# Patient Record
(demographics unavailable — no encounter records)

---

## 2020-02-12 NOTE — HP
Admitting History and Physical





- Admission


Chief Complaint: abd pain


History of Present Illness: 


77 year old F with h/o hypothyroidism, CAD s/p PCI, HTN, HLD, vertigo and 

recurrent diverticulitis presents to ED for evaluation of abd pain associated 

with multiple loose stools, decreased appetite, headache, malaise and nausea 

over the last 8days. She denies fever, chills, recent travel or sick contacts. 

Her symptoms have gradually worsened over the last few days, she had one 

episode of blood on toilet paper last evening and at the urging of her family, 

she presented to ED for evaluation. 





In ED:


Vitals: T 98, /48, HR 76, RR 20, O2 sat 98


CT abd and pelvis: Extensive left-sided colonic diverticulosis is seen, minimal 

to mild pericolonic soft tissue stranding adjacent to the lower third of the 

descending colon and the adjacent upper third of the sigmoid colon which could 

be on the basis of mild acute uncomplicated diverticulitis. 


Labs: unremarkable, WBC =11. Pt given 1 dose of flagyl 500mg and Cipro 500mg x 

1 dose. Morphine and APAP IV formulation for pain management. 


Decision made to admit patient for further monitoring.








History Source: Patient


Limitations to Obtaining History: No Limitations





- Past Medical History


CNS: Yes: Vertigo


Cardiovascular: Yes: HTN, Hyperlipdemia


Gastrointestinal: Yes: Diverticulitis, Diverticulosis, GERD


Reproductive: Yes: Postmenopausal


...Pregnant: No


...: 4


...Para: 4


Psych: Yes: Anxiety


Musculoskeletal: Yes: Osteoarthritis


Endocrine: Yes: Hypothyroidism





- Past Surgical History


Past Surgical History: Yes: Appendectomy, Cataract Removal (left eye), 

Cholecystectomy, Hernia Repair (Left inguinal), Hysterectomy


Additional Past Surgical History: 





Ovarian cyst removal prior to hysterectomy





- Smoking History


Smoking history: Never smoked


Have you smoked in the past 12 months: No


Aproximately how many cigarettes per day: 0





- Alcohol/Substance Use


Hx Alcohol Use: No


History of Substance Use: reports: None





- Social History


Usual Living Arrangement: Yes: Alone


ADL: Support Services (Pt ambulates with a cane [facility] 5hrs M - F, 3hrs Sat/Sun)


Occupation: Retired dressmaker


History of Recent Travel: No


Other Social History: HCP: daughter - Rozina Ohara [phone]





Home Medications





- Allergies


Allergies/Adverse Reactions: 


 Allergies











Allergy/AdvReac Type Severity Reaction Status Date / Time


 


No Known Allergies Allergy   Verified 20 14:05














- Home Medications


Home Medications: 


Ambulatory Orders





Esomeprazole Mag Trihydrate [Nexium] 40 mg PO DAILY 12 


Amlodipine Besylate [Norvasc -] 5 mg PO DAILY #0 tablet 14 


Rosuvastatin Calcium [Crestor] 20 mg PO HS #0 tablet 14 


Meclizine HCl [Antivert -] 25 mg PO PRN PRN 06/30/15 


Metoprolol Tartrate [Lopressor -] 50 mg PO BID 06/30/15 


Levothyroxine [Synthroid -] 50 mcg PO DAILY 20 


Loratadine [Claritin] 10 mg PO DAILY 20 


Potassium Chloride 10 meq PO DAILY 20 











Family Medical History


Other Family History: M  (90) multiple CVA,  from cancer of her 

reproductive organs.  F  (90) CVA.  Sister  (78) end stage lung 

disease due to smoking.  Brother  (46) murdered.  Brother alive (75) 

ESRD on HD.  Sister alive (74) recurrent diverticulitis s/p colon resection





Review of Systems





- Review of Systems


Constitutional: reports: Loss of Appetite, Weakness


Eyes: reports: No Symptoms


HENT: reports: No Symptoms


Neck: reports: No Symptoms


Cardiovascular: reports: No Symptoms


Respiratory: reports: No Symptoms


Gastrointestinal: reports: Abdominal Pain, Diarrhea, Nausea


Genitourinary: reports: No Symptoms


Breasts: reports: No Symptoms Reported


Musculoskeletal: reports: No Symptoms


Integumentary: reports: No Symptoms


Neurological: reports: No Symptoms


Endocrine: reports: No Symptoms


Hematology/Lymphatic: reports: No Symptoms


Psychiatric: reports: No Symptoms





Physical Examination


Vital Signs: 


 Vital Signs











Temperature  98 F   20 14:07


 


Pulse Rate  76   20 14:07


 


Respiratory Rate  20   20 14:07


 


Blood Pressure  124/48 L  20 14:07


 


O2 Sat by Pulse Oximetry (%)  98   20 14:07











Constitutional: Yes: No Distress, Calm, Obese


Eyes: Yes: Conjunctiva Clear


HENT: Yes: Atraumatic, Normocephalic


Neck: Yes: Supple, Trachea Midline


Cardiovascular: Yes: Regular Rate and Rhythm


Respiratory: Yes: Regular, CTA Bilaterally


Gastrointestinal: Yes: Normal Bowel Sounds, Soft, Abdomen, Obese, Tenderness (

LLQ)


...Rectal Exam: Yes: Deferred


Musculoskeletal: Yes: WNL


Extremities: Yes: WNL


Edema: No


Peripheral Pulses WNL: Yes


Peripheral Pulses: Left Radial: 2+, Right Radial: 2+


Integumentary: Yes: WNL


Neurological: Yes: Alert, Oriented


...Motor Strength: WNL


Psychiatric: Yes: Alert, Oriented


Labs: 


 CBC, BMP





 20 16:30 





 20 16:30 











Imaging





- Results


Cat Scan: Report Reviewed (CT abd and pelvis 2020 Impression:  Extensive 

left-sided colonic diverticulosis is seen. In comparison to a  CT exam 

there appears to be interval development of subtle minimal to mild pericolonic 

soft tissue stranding adjacent to the lower third of the descending colon and 

the adjacent upper third of the sigmoid colon which could be on the basis of 

mild acute uncomplicated diverticulitis. Correlate clinically.  The remainder 

of the exam demonstrates no definite interval change.  Stable 1.5 cm left 

adrenal nodule very likely representing an adenoma. Biochemical evaluation is 

suggested.  as on the prior exam the gallbladder is not visualized which may be 

on a postsurgical basis. Reported By: Matias Kilpatrick MD 20)





Problem List





- Problems


(1) Diverticulitis


Assessment/Plan: 


ID and GI consult in the morning


Zosyn TID


trend WBC and temp curve


Clear liquid diet- progress as tolerated


reglan PRN nausea/vomiting


gentle hydration overnight


IV tylenol PRN moderate pain


IV morhine PRN severe pain





Code(s): K57.92 - DVTRCLI OF INTEST, PART UNSP, W/O PERF OR ABSCESS W/O BLEED   





(2) HTN (hypertension)


Assessment/Plan: 


continue norvasc


Code(s): I10 - ESSENTIAL (PRIMARY) HYPERTENSION   





(3) Hyperlipidemia


Assessment/Plan: 


crestor 20mg qhs


Code(s): E78.5 - HYPERLIPIDEMIA, UNSPECIFIED   





(4) Hypothyroidism


Assessment/Plan: 


synthroid 50mcg qam


check TSH and TF4 with AM labs


Code(s): E03.9 - HYPOTHYROIDISM, UNSPECIFIED   





(5) Prophylactic measure


Assessment/Plan: 


SC heparin TID


Ambulate as tolerated


fall precautions


Code(s): Z29.9 - ENCOUNTER FOR PROPHYLACTIC MEASURES, UNSPECIFIED   





Assessment/Plan


Code status: Full








Visit type





- Emergency Visit


Emergency Visit: Yes


ED Registration Date: 20


Care time: The patient presented to the Emergency Department on the above date 

and was hospitalized for further evaluation of their emergent condition.





- New Patient


This patient is new to me today: Yes


Date on this admission: 20





- Critical Care


Critical Care patient: No

## 2020-02-12 NOTE — PDOC
Documentation entered by Lisa Stover SCRIBE, acting as scribe for 

Rahda Link MD.








Radha Link MD:  This documentation has been prepared by the Ck berman Nirvannie, SCRIBE, under my direction and personally reviewed by me in 

its entirety.  I confirm that the documentation accurately reflects all work, 

treatment, procedures, and medical decision making performed by me.  





Attending Attestation





- Resident


Resident Name: Danyel Pearce





- ED Attending Attestation


I have performed the following: I have examined & evaluated the patient, The 

case was reviewed & discussed with the resident, I agree w/resident's findings 

& plan, Exceptions are as noted





- HPI


HPI: 





02/12/20 16:40


The patient is a 77 year old female, with a significant past medical history of 

hypertension, hyperlipidemia, GERD, CVA, colitis diverticulitis, and anxiety, 

who presents to the emergency department with 10/10 abdominal pain, waxing and 

waning (8-10/10) worse in the left LLQ. She notes associated non-bloody watery 

diarrhea, dizziness, nausea, and generalized weakness. Patient notes her 

symptoms to be similar to previous bouts of diverticulitis.





She denies recent fevers, chills, headache or dizziness. She denies recent 

nausea, vomit, diarrhea or constipation. She denies recent  dysuria, frequency, 

urgency or hematuria. She denies recent chest pain or shortness of breath.





Allergies: NKDA 


GI: Dr. Diaz








- Physicial Exam


PE: 





02/12/20 19:05


awake alert lungs clear bilat heart rrr no mrg abd soft mild llq ttp no rebound 

no guarding. ext wwp. no edema. no calf tenderness. 





- Medical Decision Making





02/12/20 19:06


78 yo F with h/o diverticulitis here with llq pain. did have loose watery 

stools several episodes. some streaked with blood day prior. today nonblooyd. 

no vomiting but having nausea. no f/c





plan ct a/p r/o diverticulitis, colitis labs ua r/o uti.


will likely require admission if diverituclitis. 


signed out to [facility]

## 2020-02-12 NOTE — PDOC
History of Present Illness





- General


Chief Complaint: Pain


Stated Complaint: ABD PAIN


Time Seen by Provider: 02/12/20 15:37





- History of Present Illness


Initial Comments: 


02/12/20 16:07


76 yo F PMH HTN, HLD, CAD s/p stent on clopidogrel, unstable angina, GERD, 

diverticulitis w/ prior micro-perforations, colitis, CVA w/ no residual symptoms

, cholecystectomy, hernia repair, and anxiety, presenting with diffuse 

abdominal pain. Croatian speaking only. Patient reports that she has had diffuse 

abdominal pain for the past 10 days, ranging from 8/10 to 10/10, worse at the 

LLQ and the anus, associated with profuse watery diarrhea, nausea without 

vomiting, generalized weakness, and dizziness like the room is spinning. 





States that it feels identical to her prior diverticulitis flares. Reports that 

her family has been trying to bring her in for multiple days but she was 

reluctant; they managed to convince her today. Has taken diclofenac at home 

with some relief. Has been able to eat, but less than her normal 2/2 nausea.





Her GI doctor is Dr. Diaz. Her PCP is Dr. Damien Oseguera.





Denies fevers/chills, recent travel, CP, urinary symptoms. Complains of chronic 

shortness of breath unchanged from baseline, which she believes may be 2/2 

anxiety.








Past History





- Past Medical History


Allergies/Adverse Reactions: 


 Allergies











Allergy/AdvReac Type Severity Reaction Status Date / Time


 


No Known Allergies Allergy   Verified 02/12/20 14:05











Home Medications: 


Ambulatory Orders





Esomeprazole Mag Trihydrate [Nexium] 40 mg PO DAILY 07/28/12 


Amlodipine Besylate [Norvasc -] 5 mg PO DAILY #0 tablet 01/06/14 


Rosuvastatin Calcium [Crestor] 20 mg PO HS #0 tablet 01/06/14 


Meclizine HCl [Antivert -] 25 mg PO PRN PRN 06/30/15 


Metoprolol Tartrate [Lopressor -] 50 mg PO BID 06/30/15 


Levothyroxine [Synthroid -] 50 mcg PO DAILY 02/12/20 


Loratadine [Claritin] 10 mg PO DAILY 02/12/20 


Potassium Chloride 10 meq PO DAILY 02/12/20 








Anemia: No


Asthma: No


Cancer: No


Cardiac Disorders: Yes (UNSTABLE ANGINA)


CVA: No


COPD: No


CHF: No


Dementia: No


Diabetes: No


GI Disorders: Yes (Cholitis, diverticulitis)


 Disorders: No


HTN: Yes


Hypercholesterolemia: Yes


Liver Disease: No


Seizures: No


Thyroid Disease: No





- Surgical History


Abdominal Surgery: Yes


Appendectomy: No


Cardiac Surgery: Yes (STENT.)


Cholecystectomy: Yes


Lung Surgery: No


Neurologic Surgery: No


Orthopedic Surgery: No





- Psycho Social/Smoking Cessation Hx


Smoking Status: No


Smoking History: Former smoker


Have you smoked in the past 12 months: No


Number of Cigarettes Smoked Daily: 0


Information on smoking cessation initiated: No


Hx Alcohol Use: No


Drug/Substance Use Hx: No


Substance Use Type: None


Hx Substance Use Treatment: No





**Review of Systems





- Review of Systems


Comments:: 


02/12/20 16:15


GENERAL/CONSTITUTIONAL: endorses generalized weakness. Denies fever, chills, 

diaphoresis, malaise, loss of appetite, weight change


HEAD, EYES, EARS, NOSE AND THROAT: denies rhinorrhea, nasal congestion, throat 

pain, throat swelling, difficulty swallowing, mouth swelling, ear pain, eye pain

, visual changes


NEUROLOGIC: endorses dizziness. Denies headache, focal weakness or paresthesias

, unsteady gait, seizure, mental status changes, bladder or bowel incontinence


CARDIOVASCULAR: denies chest pain, syncope, palpitations, irregular heart rate, 

lightheadedness, peripheral edema


RESPIRATORY: endorses chronic shortness of breath. Denies cough, dyspnea with 

exertion, orthopnea, wheezing, stridor, hemoptysis


GASTROINTESTINAL: endorses diffuse abdominal pain, profuse watery diarrhea, and 

nausea w/o vomiting. Denies abdominal distension, constipation, melena, 

hematochezia


GENITOURINARY: denies dysuria, frequency, urgency, hesitancy, hematuria, flank 

pain, genital pain


MUSCULOSKELETAL: denies myalgia, arthralgia, joint swelling, back pain, neck 

pain


SKIN: denies rash, itching, pallor


HEMATOLOGIC/IMMUNOLOGIC: denies easy bleeding, easy bruising, lymphadenopathy, 

frequent infections


ENDOCRINE: denies unexplained weight gain, unexplained weight loss, heat 

intolerance, cold intolerance


PSYCHIATRIC: denies anxiety, depression, suicidal or homicidal ideation, 

hallucinations








*Physical Exam





- Vital Signs


 Last Vital Signs











Temp Pulse Resp BP Pulse Ox


 


 98 F   76   20   124/48 L  98 


 


 02/12/20 14:07  02/12/20 14:07  02/12/20 14:07  02/12/20 14:07  02/12/20 14:07














- Physical Exam


02/12/20 16:17


GENERAL: Awake, alert, and fully oriented, in moderate distress.


HEAD: Normal with no signs of trauma.


EYES: Pupils equal, round and reactive to light, extraocular movements intact, 

sclera anicteric, conjunctiva clear. No lid lag.


EARS, NOSE, THROAT: Ears normal, nares patent, oropharynx clear without 

exudates. Dry mucous membranes.


NECK: Normal range of motion, supple without lymphadenopathy


LUNGS: Breath sounds equal, clear to auscultation bilaterally. No wheezes, and 

no crackles. No accessory muscle use.


HEART: Regular rate and rhythm, normal S1 and S2 without murmur, rub or gallop


ABDOMEN: Soft, diffuse tenderness worse in LLQ and along midline, non-distended

, normoactive bowel sounds, negative guarding, negative rebound


MUSCULOSKELETAL: Normal range of motion at all joints. No bony deformities or 

tenderness. No CVA tenderness.


UPPER EXTREMITIES: 2+ pulses, warm, well-perfused. No cyanosis. No clubbing. 

Cap refill <2 seconds. No peripheral edema.


LOWER EXTREMITIES: 2+ pulses, warm, well-perfused. No calf tenderness. No 

peripheral edema.


NEUROLOGICAL: Cranial nerves II-XII intact. Normal speech.


PSYCHIATRIC: Cooperative. Good eye contact. Appropriate mood and affect.


SKIN: Warm, dry, normal turgor, no rashes or lesions noted.








ED Treatment Course





- LABORATORY


CBC & Chemistry Diagram: 


 02/12/20 16:30





 02/12/20 16:30





Medical Decision Making





- Medical Decision Making


02/12/20 16:19


Concern for diverticulitis v colitis v gastroenteritis.


- CBC, CMP


- EKG, trop


- lipase


- 1L LR


- Ofirmev


- Zofran


- CT abd/pelvis w/ contrast pending Cr





02/12/20 16:46


EKG normal sinus at 70 bpm, RBBB, T wave inversions in V1-V3. Prior EKG in 2013 

reportedly also had RBBB.





02/12/20 17:04


Patient reassessed, feeling better.





02/12/20 20:12


CT scan with extensive left sided diverticulitis. Giving metronidazole 500 mg 

and admitting patient.








Discharge





- Discharge Information


Problems reviewed: Yes


Clinical Impression/Diagnosis: 


 Diverticulitis





Condition: Stable





- Follow up/Referral





- Patient Discharge Instructions





- Post Discharge Activity

## 2020-02-13 NOTE — PN
Progress Note, Physician





- Current Medication List


Current Medications: 


Active Medications





Acetaminophen (Ofirmev Injection -)  1,000 mg IVPB Q6H PRN


   PRN Reason: PAIN LEVEL 4 - 6


   Stop: 02/13/20 21:37


   Last Admin: 02/13/20 02:53 Dose:  1,000 mg


Amlodipine Besylate (Norvasc -)  5 mg PO DAILY [facility]


Heparin Sodium (Porcine) (Heparin -)  5,000 unit SQ TID [facility]


   Last Admin: 02/13/20 05:51 Dose:  5,000 unit


Sodium Chloride (1/2 Normal Saline)  1,000 mls @ 42 mls/hr IV ASDIR [facility]


   Last Admin: 02/12/20 21:46 Dose:  42 mls/hr


Piperacillin Sod/Tazobactam (Sod 3.375 gm/ Dextrose)  50 mls @ 100 mls/hr IVPB 

Q8H-IV LORRAINE; Protocol


Piperacillin Sod/Tazobactam (Sod 3.375 gm/ Dextrose)  50 mls @ 100 mls/hr IVPB 

Q8H-IV [facility]


   Stop: 02/14/20 05:59


   Last Admin: 02/13/20 05:51 Dose:  100 mls/hr


Levothyroxine Sodium (Synthroid -)  50 mcg PO DAILY@0700 [facility]


   Last Admin: 02/13/20 06:42 Dose:  50 mcg


Loratadine (Claritin -)  10 mg PO DAILY [facility]


Metoclopramide HCl (Reglan Injection -)  10 mg IVPUSH Q8H PRN


   PRN Reason: NAUSEA AND/OR VOMITING


   Last Admin: 02/13/20 02:54 Dose:  10 mg


Metoprolol Tartrate (Lopressor -)  50 mg PO BID [facility]


   Last Admin: 02/12/20 22:50 Dose:  50 mg


Morphine Sulfate (Morphine Sulfate)  2 mg IVPUSH Q4H PRN


   PRN Reason: PAIN LEVEL 7 - 10


Pantoprazole Sodium (Protonix -)  40 mg PO DAILY [facility]


Rosuvastatin Calcium (Crestor -)  20 mg PO HS [facility]


   Last Admin: 02/12/20 22:40 Dose:  20 mg











- Objective


Vital Signs: 


 Vital Signs











Temperature  98.4 F   02/12/20 19:40


 


Pulse Rate  80   02/13/20 05:45


 


Respiratory Rate  16   02/13/20 05:45


 


Blood Pressure  136/56 L  02/13/20 05:45


 


O2 Sat by Pulse Oximetry (%)  95   02/13/20 05:45











Cardiovascular: Yes: Regular Rate and Rhythm


Respiratory: Yes: Regular, CTA Bilaterally


Gastrointestinal: Yes: Normal Bowel Sounds, Soft, Tenderness


Labs: 


 CBC, BMP





 02/13/20 05:40 





 02/13/20 05:40 





 INR, PTT











INR  1.07  (0.83-1.09)   02/13/20  05:40    














Problem List





- Problems


(1) Diverticulitis


Assessment/Plan: 


iv abx


id and surgical consults


ivf


Code(s): K57.92 - DVTRCLI OF INTEST, PART UNSP, W/O PERF OR ABSCESS W/O BLEED   





(2) CAD (coronary artery disease)


Assessment/Plan: 


same meds


Code(s): I25.10 - ATHSCL HEART DISEASE OF NATIVE CORONARY ARTERY W/O ANG PCTRS 

  





(3) HTN (hypertension)


Assessment/Plan: 


monitor


 Vital Signs











 Period  Temp  Pulse  Resp  BP Sys/Donohue  Pulse Ox


 


 Last 24 Hr  98 F-98.4 F  76-88  16-20  124-136/48-56  95-98











Code(s): I10 - ESSENTIAL (PRIMARY) HYPERTENSION   





(4) Hyperlipidemia


Code(s): E78.5 - HYPERLIPIDEMIA, UNSPECIFIED

## 2020-02-13 NOTE — PN
Progress Note (short form)





- Note


Progress Note: 





ID consult dictated





diverticulitis- 10 days intermittent pain, nonbloody diarrhea


no recent antibiotics


last diverticulitis flare was over a year ago


vomiting yesterday and this am as welll





diverticulitis


diarrhea


send stools for cdiff and stool cultures and wbc


rocephin/flagyl











Problem List





- Problems


(1) Diverticulitis


Code(s): K57.92 - DVTRCLI OF INTEST, PART UNSP, W/O PERF OR ABSCESS W/O BLEED   





(2) Diarrhea


Code(s): R19.7 - DIARRHEA, UNSPECIFIED

## 2020-02-13 NOTE — CON.GI
Consult


Consult Specialty:: GI


Referred by:: Laury Mcmanus NP


Reason for Consultation:: recurrent diverticulitis





- History of Present Illness


Chief Complaint: Abdominal Pain


History of Present Illness: 





Patient is a 76 y/o female with past medical history of Hypothyroidism, CAD s/p 

PCI, HTN, HLD, Vertigo, and recurrent Diverticulitis.  Consult was placed for 

recurrent diverticulitis.  Developed non bloody diarrhea for the past 8 days. 

She denies any travel hisotyr and recent antibiotic use associated with 

abdominal pain 5/10.Her last colonoscopy 9/19 showed tubular adenoma polyp in 

rectum with inflammation in the rectum.  CTAP shows extensive left sided 

colonic diverticulitis, interval development of subtle minimal to mild 

pericolonic soft tissue stranding adjacent to lower third of descending colon 

and adjacent upper third of sigmoid colon which could be basis of mild acute 

uncomplicated diverticulitis.  





- History Source


History Provided By: Patient


Limitations to Obtaining History: No Limitations





- Past Medical History


CNS: Yes: Vertigo


Cardio/Vascular: Yes: HTN, Hyperlipdemia


Gastrointestinal: Yes: Diverticulitis, Diverticulosis, GERD


...Pregnant: No


Psych: Yes: Anxiety


Musculoskeletal: Yes: Osteoarthritis


Endocrine: Yes: Hypothyroidism





- Past Surgical History


Past Surgical History: Yes: Appendectomy, Cataract Removal (left eye), 

Cholecystectomy, Hernia Repair (Left inguinal), Hysterectomy





- Alcohol/Substance Use


Hx Alcohol Use: No


History of Substance Use: reports: None





- Smoking History


Smoking history: Never smoked


Have you smoked in the past 12 months: No


Aproximately how many cigarettes per day: 0





- Social History


ADL: Support Services (Pt ambulates with a cane [facility] 5hrs M - F, 3hrs Sat/Sun)


Occupation: Retired dressmaker


History of Recent Travel: No





Home Medications





- Allergies


Allergies/Adverse Reactions: 


 Allergies











Allergy/AdvReac Type Severity Reaction Status Date / Time


 


No Known Allergies Allergy   Verified 02/12/20 14:05














- Home Medications


Home Medications: 


Ambulatory Orders





Esomeprazole Mag Trihydrate [Nexium] 40 mg PO DAILY 07/28/12 


Amlodipine Besylate [Norvasc -] 5 mg PO DAILY #0 tablet 01/06/14 


Rosuvastatin Calcium [Crestor] 20 mg PO HS #0 tablet 01/06/14 


Meclizine HCl [Antivert -] 25 mg PO PRN PRN 06/30/15 


Metoprolol Tartrate [Lopressor -] 50 mg PO BID 06/30/15 


Levothyroxine [Synthroid -] 50 mcg PO DAILY 02/12/20 


Loratadine [Claritin] 10 mg PO DAILY 02/12/20 


Potassium Chloride 10 meq PO DAILY 02/12/20 











Review of Systems





- Review of Systems


Constitutional: reports: Loss of Appetite


Eyes: reports: No Symptoms


HENT: reports: No Symptoms


Neck: reports: No Symptoms


Cardiovascular: reports: No Symptoms


Respiratory: reports: No Symptoms


Gastrointestinal: reports: Abdominal Pain, Constipation, Diarrhea, Nausea, 

Vomiting


Genitourinary: reports: No Symptoms


Breasts: reports: No Symptoms Reported


Musculoskeletal: reports: No Symptoms


Integumentary: reports: No Symptoms


Neurological: reports: No Symptoms


Endocrine: reports: No Symptoms


Hematology/Lymphatic: reports: No Symptoms


Psychiatric: reports: No Symptoms





Physical Exam-GI


Vital Signs: 


 Vital Signs











Temperature  98.4 F   02/12/20 19:40


 


Pulse Rate  80   02/13/20 05:45


 


Respiratory Rate  16   02/13/20 05:45


 


Blood Pressure  136/56 L  02/13/20 05:45


 


O2 Sat by Pulse Oximetry (%)  95   02/13/20 05:45











Constitutional: Yes: No Distress, Calm


Eyes: Yes: Conjunctiva Clear


HENT: Yes: Atraumatic


Cardiovascular: Yes: Regular Rate and Rhythm


Respiratory: Yes: Regular, CTA Bilaterally


Gastrointestinal Inspection: Yes: WNL.  No: Ascites, Distention, Hernia, Scars, 

Other


...Auscultate: Yes: Normoactive Bowel Sounds.  No: Hyperactive Bowel Sounds, 

Hypoactive Bowel Sounds, No Bowel Sounds, Other


...Palpate: Yes: Soft, Tenderness (diffuse but more tender in lower quadrants), 

Tenderness, Epigastium.  No: Firm/Rigid, Guarding, Hepatomegaly, Mass, 

Pulsatile Mass, Splenomegaly, Tenderness, Rebound, Other


...Percussion: Yes: Tympanitic.  No: Dullness, Fluid Wave, Other


Neurological: Yes: Alert, Oriented


Psychiatric: Yes: Alert, Oriented


Labs: 


 CBC, BMP





 02/13/20 05:40 





 INR, PTT











INR  1.07  (0.83-1.09)   02/13/20  05:40    








Active Medications











Generic Name Dose Route Start Last Admin





  Trade Name Freq  PRN Reason Stop Dose Admin


 


Acetaminophen  1,000 mg  02/12/20 21:36  02/13/20 02:53





  Ofirmev Injection -  IVPB  02/13/20 21:37  1,000 mg





  Q6H PRN   Administration





  PAIN LEVEL 4 - 6   





     





     





     


 


Amlodipine Besylate  5 mg  02/13/20 10:00  





  Norvasc -  PO   





  DAILY [facility]   





     





     





     





     


 


Heparin Sodium (Porcine)  5,000 unit  02/12/20 20:45  02/13/20 05:51





  Heparin -  SQ   5,000 unit





  TID [facility]   Administration





     





     





     





     


 


Sodium Chloride  1,000 mls @ 42 mls/hr  02/12/20 20:30  02/12/20 21:46





  1/2 Normal Saline  IV   42 mls/hr





  ASDIR [facility]   Administration





     





     





     





     


 


Piperacillin Sod/Tazobactam  50 mls @ 100 mls/hr  02/13/20 06:00  





  Sod 3.375 gm/ Dextrose  IVPB   





  Q8H-IV [facility]   





     





     





  Protocol   





     


 


Piperacillin Sod/Tazobactam  50 mls @ 100 mls/hr  02/13/20 06:00  02/13/20 05:51





  Sod 3.375 gm/ Dextrose  IVPB  02/14/20 05:59  100 mls/hr





  Q8H-IV [facility]   Administration





     





     





     





     


 


Levothyroxine Sodium  50 mcg  02/13/20 07:00  02/13/20 06:42





  Synthroid -  PO   50 mcg





  DAILY@0700 [facility]   Administration





     





     





     





     


 


Loratadine  10 mg  02/13/20 10:00  





  Claritin -  PO   





  DAILY [facility]   





     





     





     





     


 


Metoclopramide HCl  10 mg  02/12/20 21:36  02/13/20 02:54





  Reglan Injection -  IVPUSH   10 mg





  Q8H PRN   Administration





  NAUSEA AND/OR VOMITING   





     





     





     


 


Metoprolol Tartrate  50 mg  02/12/20 22:00  02/12/20 22:50





  Lopressor -  PO   50 mg





  BID [facility]   Administration





     





     





     





     


 


Morphine Sulfate  2 mg  02/12/20 21:36  





  Morphine Sulfate  IVPUSH   





  Q4H PRN   





  PAIN LEVEL 7 - 10   





     





     





     


 


Pantoprazole Sodium  40 mg  02/13/20 10:00  





  Protonix -  PO   





  DAILY [facility]   





     





     





     





     


 


Rosuvastatin Calcium  20 mg  02/12/20 22:00  02/12/20 22:40





  Crestor -  PO   20 mg





  HS [facility]   Administration





     





     





     





     














Imaging





- Results


Cat Scan: Report Reviewed





Problem List





- Problems


(1) Diverticulitis


Assessment/Plan: 


R> continue NPO 


continue Zosyn and Flagyl


Code(s): K57.92 - DVTRCLI OF INTEST, PART UNSP, W/O PERF OR ABSCESS W/O BLEED   





(2) Diarrhea


Assessment/Plan: 


R> stool analysis ordered


     


Code(s): R19.7 - DIARRHEA, UNSPECIFIED

## 2020-02-13 NOTE — EKG
Test Reason : 

Blood Pressure : ***/*** mmHG

Vent. Rate : 070 BPM     Atrial Rate : 070 BPM

   P-R Int : 160 ms          QRS Dur : 132 ms

    QT Int : 444 ms       P-R-T Axes : 062 078 024 degrees

   QTc Int : 479 ms

 

NORMAL SINUS RHYTHM

RIGHT BUNDLE BRANCH BLOCK

ABNORMAL ECG

WHEN COMPARED WITH ECG OF 29-DEC-2013 09:50,

NO SIGNIFICANT CHANGE WAS FOUND

Confirmed by JANNY SEXTON MD ([id number]) on 2/13/2020 3:15:03 PM

 

Referred By:             Confirmed By:JANNY SEXTON MD

## 2020-02-14 NOTE — PN.GI
GI Progress Note


Subjective: 


she continues to have diarrhea, abdominal pain resolved, no nausea and vomiting





- Objective


Vital Signs: 


 Vital Signs











Temperature  98.8 F   02/14/20 05:00


 


Pulse Rate  78   02/14/20 05:00


 


Respiratory Rate  20   02/14/20 05:00


 


Blood Pressure  141/59 L  02/14/20 05:00


 


O2 Sat by Pulse Oximetry (%)  95   02/13/20 21:00











Constitutional: Obese


Eyes: Yes: Conjunctiva Clear


HENT: Yes: Atraumatic


Neck: Yes: Supple


Cardiovascular: Yes: Regular Rate and Rhythm


Respiratory: Yes: CTA Bilaterally


...Palpate: Yes: Soft.  No: Guarding, Hepatomegaly, Mass, Pulsatile Mass, 

Tenderness


Labs: 


 CBC, BMP





 02/13/20 05:40 





 02/13/20 05:40 





 INR, PTT











INR  1.07  (0.83-1.09)   02/13/20  05:40    














Problem List





- Problems


(1) Diverticulitis


Assessment/Plan: 


resolving


R> advance diet


Code(s): K57.92 - DVTRCLI OF INTEST, PART UNSP, W/O PERF OR ABSCESS W/O BLEED   





(2) Diarrhea


Assessment/Plan: 


R> full liquids if tolerated advance to low fiber lactose free diet


     stool culture requested


Code(s): R19.7 - DIARRHEA, UNSPECIFIED

## 2020-02-14 NOTE — PN
Progress Note, Physician


Chief Complaint: 





medicine coverage for Dr. Conrad/Yane Martin





tx for diverticulitis/osis, complaining of headache and abd pain after eating 

yogurt for lunch. diet was advanced this morning by GI. family at bedside


History of Present Illness: 





77 year old Slovak speaking female with PMH diverticulosis, htn, hld, 

hypothyroidism, CAD s/p PCI, vertigo being treated for diverticulitis. Seen by 

ID and GI. stool studies pending, on IVF, IV antbx





- Current Medication List


Current Medications: 


Active Medications





Acetaminophen (Tylenol Oral Solution -)  650 mg PO Q6H PRN


   PRN Reason: PAIN LEVEL 4 - 6


Amlodipine Besylate (Norvasc -)  5 mg PO DAILY [facility]


   Last Admin: 02/14/20 09:30 Dose:  5 mg


Heparin Sodium (Porcine) (Heparin -)  5,000 unit SQ TID [facility]


   Last Admin: 02/14/20 14:12 Dose:  5,000 unit


Sodium Chloride (1/2 Normal Saline)  1,000 mls @ 42 mls/hr IV ASDIR [facility]


   Last Admin: 02/14/20 02:22 Dose:  Not Given


Ceftriaxone Sodium 2 gm/ (Dextrose)  100 mls @ 200 mls/hr IVPB DAILY [facility]; 

Protocol


   Last Admin: 02/14/20 14:12 Dose:  200 mls/hr


Metronidazole (Flagyl 500mg Premixed Ivpb -)  500 mg in 100 mls @ 100 mls/hr 

IVPB Q8H-IV [facility]


   Last Admin: 02/14/20 11:10 Dose:  100 mls/hr


Lactobacillus Acidophilus (Bacid -)  1 tab PO DAILY [facility]


Levothyroxine Sodium (Synthroid -)  50 mcg PO DAILY@0700 [facility]


   Last Admin: 02/14/20 06:22 Dose:  50 mcg


Loratadine (Claritin -)  10 mg PO DAILY [facility]


   Last Admin: 02/14/20 09:31 Dose:  10 mg


Metoclopramide HCl (Reglan Injection -)  10 mg IVPUSH Q8H PRN


   PRN Reason: NAUSEA AND/OR VOMITING


   Last Admin: 02/13/20 02:54 Dose:  10 mg


Metoprolol Tartrate (Lopressor -)  50 mg PO BID [facility]


   Last Admin: 02/14/20 09:30 Dose:  50 mg


Pantoprazole Sodium (Protonix -)  40 mg PO DAILY [facility]


   Last Admin: 02/14/20 09:30 Dose:  40 mg


Rosuvastatin Calcium (Crestor -)  20 mg PO HS [facility]


   Last Admin: 02/13/20 21:47 Dose:  20 mg











- Objective


Vital Signs: 


 Vital Signs











Temperature  99 F   02/14/20 09:05


 


Pulse Rate  79   02/14/20 09:05


 


Respiratory Rate  20   02/14/20 09:05


 


Blood Pressure  145/72   02/14/20 09:05


 


O2 Sat by Pulse Oximetry (%)  95   02/13/20 21:00











Constitutional: Yes: Well Nourished


Eyes: Yes: WNL


HENT: Yes: WNL


Neck: Yes: WNL, Supple


Cardiovascular: Yes: WNL, Regular Rate and Rhythm


Respiratory: Yes: WNL


Gastrointestinal: Yes: Soft, Tenderness


...Rectal Exam: Yes: Deferred


Genitourinary: Yes: WNL


Musculoskeletal: Yes: WNL


Extremities: Yes: WNL


Edema: No


Neurological: Yes: WNL, Other (Slovak speaking)


Psychiatric: Yes: WNL


Labs: 


 CBC, BMP





 02/13/20 05:40 





 02/13/20 05:40 





 INR, PTT











INR  1.07  (0.83-1.09)   02/13/20  05:40    














Problem List





- Problems


(1) Diverticulitis


Assessment/Plan: 


GI and ID following


IV antbx- ceftriaxone and flagyl


IVF


stool studies pending


diet advanced today


one time IV APAP , refusing morphine


states moved her bowels (5 small movements) since she had yogurt





Problems reviewed: Yes   


Code(s): K57.92 - DVTRCLI OF INTEST, PART UNSP, W/O PERF OR ABSCESS W/O BLEED   





(2) CAD (coronary artery disease)


Assessment/Plan: 


cont home meds


Problems reviewed: Yes   


Code(s): I25.10 - ATHSCL HEART DISEASE OF NATIVE CORONARY ARTERY W/O ANG PCTRS 

  





(3) HTN (hypertension)


Assessment/Plan: 


continue lopressor, norvasc


Problems reviewed: Yes   


Code(s): I10 - ESSENTIAL (PRIMARY) HYPERTENSION   





(4) Hyperlipidemia


Assessment/Plan: 


continue crestor


Problems reviewed: Yes   


Code(s): E78.5 - HYPERLIPIDEMIA, UNSPECIFIED   





(5) Hypothyroidism


Assessment/Plan: 


continue synthroid


Problems reviewed: Yes   


Code(s): E03.9 - HYPOTHYROIDISM, UNSPECIFIED   





(6) Obesity (BMI 35.0-39.9 without comorbidity)


Assessment/Plan: 


f/u outpatient bariatric surgery


f/u outpatient dietician


counseled on weight loss


Problems reviewed: Yes   


Code(s): E66.9 - OBESITY, UNSPECIFIED   





Assessment/Plan





#diverticulosis


#obesity


#HTN


#HLD


#hypothyroidism


#CAD





dispo planning- likely home with family. lives with son, uses a walker

## 2020-02-14 NOTE — PN
Progress Note (short form)





- Note


Progress Note: 





still some abdominal pain


still diarrhea





stools just collected





 Vital Signs











 Period  Temp  Pulse  Resp  BP Sys/Donohue  Pulse Ox


 


 Last 24 Hr  98.0 F-99 F  69-79  18-29  114-146/49-72  95-97








cor-rrr


lungs clear


abd soft, mild suprapubic and rlq discomfort


ext no edema





 CBC, BMP





 02/13/20 05:40 





 02/13/20 05:40 





a/p


diverticulitis- 10 days intermittent pain, nonbloody diarrhea





diverticulitis


diarrhea


send stools for cdiff and stool cultures and wbc


rocephin/flagyl











Problem List





- Problems


(1) Diverticulitis


Code(s): K57.92 - DVTRCLI OF INTEST, PART UNSP, W/O PERF OR ABSCESS W/O BLEED   





(2) Diarrhea


Code(s): R19.7 - DIARRHEA, UNSPECIFIED

## 2020-02-15 NOTE — PN
Progress Note, Physician





- Current Medication List


Current Medications: 


Active Medications





Acetaminophen (Tylenol Oral Solution -)  650 mg PO Q6H PRN


   PRN Reason: PAIN LEVEL 4 - 6


   Last Admin: 02/15/20 08:11 Dose:  650 mg


Amlodipine Besylate (Norvasc -)  5 mg PO DAILY [facility]


   Last Admin: 02/14/20 09:30 Dose:  5 mg


Heparin Sodium (Porcine) (Heparin -)  5,000 unit SQ TID [facility]


   Last Admin: 02/15/20 06:17 Dose:  5,000 unit


Sodium Chloride (1/2 Normal Saline)  1,000 mls @ 42 mls/hr IV ASDIR [facility]


   Last Admin: 02/14/20 21:35 Dose:  Not Given


Ceftriaxone Sodium 2 gm/ (Dextrose)  100 mls @ 200 mls/hr IVPB DAILY [facility]; 

Protocol


   Last Admin: 02/14/20 14:12 Dose:  200 mls/hr


Metronidazole (Flagyl 500mg Premixed Ivpb -)  500 mg in 100 mls @ 100 mls/hr 

IVPB Q8H-IV [facility]


   Last Admin: 02/15/20 01:48 Dose:  100 mls/hr


Lactobacillus Acidophilus (Bacid -)  1 tab PO DAILY [facility]


Levothyroxine Sodium (Synthroid -)  50 mcg PO DAILY@0700 [facility]


   Last Admin: 02/15/20 06:17 Dose:  50 mcg


Loratadine (Claritin -)  10 mg PO DAILY [facility]


   Last Admin: 02/14/20 09:31 Dose:  10 mg


Metoclopramide HCl (Reglan Injection -)  10 mg IVPUSH Q8H PRN


   PRN Reason: NAUSEA AND/OR VOMITING


   Last Admin: 02/13/20 02:54 Dose:  10 mg


Metoprolol Tartrate (Lopressor -)  50 mg PO BID [facility]


   Last Admin: 02/14/20 21:45 Dose:  50 mg


Pantoprazole Sodium (Protonix -)  40 mg PO DAILY [facility]


   Last Admin: 02/14/20 09:30 Dose:  40 mg


Rosuvastatin Calcium (Crestor -)  20 mg PO HS [facility]


   Last Admin: 02/14/20 21:45 Dose:  20 mg











- Objective


Vital Signs: 


 Vital Signs











Temperature  99.1 F   02/15/20 07:46


 


Pulse Rate  78   02/15/20 07:46


 


Respiratory Rate  20   02/15/20 07:46


 


Blood Pressure  139/59 L  02/15/20 07:46


 


O2 Sat by Pulse Oximetry (%)  95   02/14/20 10:00











Cardiovascular: Yes: S1, S2


Respiratory: Yes: Regular, CTA Bilaterally


Gastrointestinal: Yes: Normal Bowel Sounds, Soft


Labs: 


 CBC, BMP





 02/13/20 05:40 





 02/13/20 05:40 





 INR, PTT











INR  1.07  (0.83-1.09)   02/13/20  05:40    














Problem List





- Problems


(1) Diverticulitis


Code(s): K57.92 - DVTRCLI OF INTEST, PART UNSP, W/O PERF OR ABSCESS W/O BLEED   





(2) CAD (coronary artery disease)


Code(s): I25.10 - ATHSCL HEART DISEASE OF NATIVE CORONARY ARTERY W/O ANG PCTRS 

  





(3) HTN (hypertension)


Code(s): I10 - ESSENTIAL (PRIMARY) HYPERTENSION   





(4) Hyperlipidemia


Code(s): E78.5 - HYPERLIPIDEMIA, UNSPECIFIED   





Assessment/Plan








- Problems


(1) Diverticulitis


Assessment/Plan: 


GI and ID following


IV antbx- ceftriaxone and flagyl


IVF


stool studies pending


diet advanced today


one time IV APAP , refusing morphine


states moved her bowels (5 small movements) since she had yogurt





Problems reviewed: Yes   


Code(s): K57.92 - DVTRCLI OF INTEST, PART UNSP, W/O PERF OR ABSCESS W/O BLEED   





(2) CAD (coronary artery disease)


Assessment/Plan: 


cont home meds


Problems reviewed: Yes   


Code(s): I25.10 - ATHSCL HEART DISEASE OF NATIVE CORONARY ARTERY W/O ANG PCTRS 

  





(3) HTN (hypertension)


Assessment/Plan: 


continue lopressor, norvasc


Problems reviewed: Yes   


Code(s): I10 - ESSENTIAL (PRIMARY) HYPERTENSION   





(4) Hyperlipidemia


Assessment/Plan: 


continue crestor


Problems reviewed: Yes   


Code(s): E78.5 - HYPERLIPIDEMIA, UNSPECIFIED   





(5) Hypothyroidism


Assessment/Plan: 


continue synthroid


Problems reviewed: Yes   


Code(s): E03.9 - HYPOTHYROIDISM, UNSPECIFIED   





(6) Obesity (BMI 35.0-39.9 without comorbidity)


Assessment/Plan: 


f/u outpatient bariatric surgery


f/u outpatient dietician


counseled on weight loss


Problems reviewed: Yes   


Code(s): E66.9 - OBESITY, UNSPECIFIED

## 2020-02-16 NOTE — PN
Progress Note, Physician


History of Present Illness: 





feels better today





- Current Medication List


Current Medications: 


Active Medications





Acetaminophen (Tylenol Oral Solution -)  650 mg PO Q6H PRN


   PRN Reason: PAIN LEVEL 4 - 6


   Last Admin: 02/15/20 08:11 Dose:  650 mg


Acetaminophen (Ofirmev Injection -)  1,000 mg IVPB Q6H PRN


   PRN Reason: PAIN LEVEL 1-3


   Stop: 02/16/20 13:59


   Last Admin: 02/16/20 00:26 Dose:  1,000 mg


Amlodipine Besylate (Norvasc -)  5 mg PO DAILY [facility]


   Last Admin: 02/16/20 09:44 Dose:  5 mg


Heparin Sodium (Porcine) (Heparin -)  5,000 unit SQ TID [facility]


   Last Admin: 02/16/20 06:30 Dose:  5,000 unit


Hydromorphone HCl (Dilaudid Vial -)  2 mg IM Q8H PRN


   PRN Reason: PAIN LEVEL 7-10


   Last Admin: 02/15/20 16:49 Dose:  2 mg


Sodium Chloride (1/2 Normal Saline)  1,000 mls @ 42 mls/hr IV ASDIR [facility]


   Last Admin: 02/16/20 00:26 Dose:  42 mls/hr


Ceftriaxone Sodium 2 gm/ (Dextrose)  100 mls @ 200 mls/hr IVPB DAILY [facility]; 

Protocol


   Last Admin: 02/16/20 09:44 Dose:  200 mls/hr


Metronidazole (Flagyl 500mg Premixed Ivpb -)  500 mg in 100 mls @ 100 mls/hr 

IVPB Q8H-IV [facility]


   Last Admin: 02/16/20 09:45 Dose:  100 mls/hr


Lactobacillus Acidophilus (Bacid -)  1 tab PO DAILY [facility]


   Last Admin: 02/16/20 09:44 Dose:  1 tab


Levothyroxine Sodium (Synthroid -)  50 mcg PO DAILY@0700 [facility]


   Last Admin: 02/16/20 06:30 Dose:  50 mcg


Loratadine (Claritin -)  10 mg PO DAILY [facility]


   Last Admin: 02/16/20 09:44 Dose:  10 mg


Metoclopramide HCl (Reglan Injection -)  10 mg IVPUSH Q8H PRN


   PRN Reason: NAUSEA AND/OR VOMITING


   Last Admin: 02/15/20 16:49 Dose:  10 mg


Metoprolol Tartrate (Lopressor -)  50 mg PO BID [facility]


   Last Admin: 02/16/20 09:44 Dose:  50 mg


Pantoprazole Sodium (Protonix -)  40 mg PO DAILY [facility]


   Last Admin: 02/16/20 09:44 Dose:  40 mg


Rosuvastatin Calcium (Crestor -)  20 mg PO HS [facility]


   Last Admin: 02/15/20 21:48 Dose:  20 mg











- Objective


Vital Signs: 


 Vital Signs











Temperature  99.3 F   02/16/20 06:00


 


Pulse Rate  76   02/16/20 06:00


 


Respiratory Rate  18   02/16/20 06:00


 


Blood Pressure  130/59 L  02/16/20 06:00


 


O2 Sat by Pulse Oximetry (%)  96   02/15/20 21:00











Cardiovascular: Yes: Regular Rate and Rhythm


Respiratory: Yes: Regular, CTA Bilaterally


Gastrointestinal: Yes: Normal Bowel Sounds, Soft


Labs: 


 CBC, BMP





 02/16/20 08:17 





 02/16/20 08:17 





 INR, PTT











INR  1.07  (0.83-1.09)   02/13/20  05:40    














Problem List





- Problems


(1) Diverticulitis


Code(s): K57.92 - DVTRCLI OF INTEST, PART UNSP, W/O PERF OR ABSCESS W/O BLEED   





(2) CAD (coronary artery disease)


Code(s): I25.10 - ATHSCL HEART DISEASE OF NATIVE CORONARY ARTERY W/O ANG PCTRS 

  





(3) HTN (hypertension)


Code(s): I10 - ESSENTIAL (PRIMARY) HYPERTENSION   





(4) Hyperlipidemia


Code(s): E78.5 - HYPERLIPIDEMIA, UNSPECIFIED   





Assessment/Plan








- Problems


(1) Diverticulitis


Assessment/Plan: 


GI and ID following


IV antbx- ceftriaxone and flagyl


IVF


stool studies pending


diet advanced today


one time IV APAP , refusing morphine


states moved her bowels (5 small movements) since she had yogurt





Problems reviewed: Yes   


Code(s): K57.92 - DVTRCLI OF INTEST, PART UNSP, W/O PERF OR ABSCESS W/O BLEED   





(2) CAD (coronary artery disease)


Assessment/Plan: 


cont home meds


Problems reviewed: Yes   


Code(s): I25.10 - ATHSCL HEART DISEASE OF NATIVE CORONARY ARTERY W/O ANG PCTRS 

  





(3) HTN (hypertension)


Assessment/Plan: 


continue lopressor, norvasc


Problems reviewed: Yes   


Code(s): I10 - ESSENTIAL (PRIMARY) HYPERTENSION   





(4) Hyperlipidemia


Assessment/Plan: 


continue crestor


Problems reviewed: Yes   


Code(s): E78.5 - HYPERLIPIDEMIA, UNSPECIFIED   





(5) Hypothyroidism


Assessment/Plan: 


continue synthroid


Problems reviewed: Yes   


Code(s): E03.9 - HYPOTHYROIDISM, UNSPECIFIED   





(6) Obesity (BMI 35.0-39.9 without comorbidity)


Assessment/Plan: 


f/u outpatient bariatric surgery


f/u outpatient dietician


counseled on weight loss


Problems reviewed: Yes   


Code(s): E66.9 - OBESITY, UNSPECIFIED

## 2020-02-17 NOTE — DS
Physical Examination


Vital Signs: 


 Vital Signs











Temperature  98.1 F   02/17/20 10:00


 


Pulse Rate  74   02/17/20 10:00


 


Respiratory Rate  18   02/17/20 10:00


 


Blood Pressure  137/69   02/17/20 10:00


 


O2 Sat by Pulse Oximetry (%)  96   02/16/20 21:00











Constitutional: Yes: Well Nourished, No Distress


HENT: Yes: WNL


Neck: Yes: Supple


Cardiovascular: Yes: Regular Rate and Rhythm


Respiratory: Yes: WNL


Gastrointestinal: Yes: WNL, Normal Bowel Sounds, Soft


Integumentary: Yes: WNL


Neurological: Yes: WNL


Labs: 


 CBC, BMP





 02/16/20 08:17 





 02/16/20 08:17 











Discharge Summary


Problems reviewed: Yes


Reason For Visit: ABD PAIN


Current Active Problems





CAD (coronary artery disease) (Acute)


Diverticulitis (Acute)


HTN (hypertension) (Acute)


Hyperlipidemia (Acute)


Hypothyroidism (Acute)


Obesity (BMI 35.0-39.9 without comorbidity) (Acute)


Prophylactic measure (Acute)








Hospital Course: 


Patient was seen and evaluated by ID and GI, was given IV anbtx, bowel rest, IV 

hydration, IV pain medication. Her diet was advanced. she is ambulatory. she is 

stable for dc and follow up with PCP.


Plan of Treatment: 


follow up with PCP, continue antbx regimen.


Condition: Stable





- Instructions


Referrals: 


Damien Oseguera [Primary Care Provider] - 


Disposition: [facility]/HOME HEALTH CARE





- Home Medications


Comprehensive Discharge Medication List: 


Ambulatory Orders





Esomeprazole Mag Trihydrate [Nexium] 40 mg PO DAILY 07/28/12 


Amlodipine Besylate [Norvasc -] 5 mg PO DAILY #0 tablet 01/06/14 


Rosuvastatin Calcium [Crestor] 20 mg PO HS #0 tablet 01/06/14 


Meclizine HCl [Antivert -] 25 mg PO PRN PRN 06/30/15 


Metoprolol Tartrate [Lopressor -] 50 mg PO BID 06/30/15 


Levothyroxine [Synthroid -] 50 mcg PO DAILY 02/12/20 


Loratadine [Claritin] 10 mg PO DAILY 02/12/20 


Potassium Chloride 10 meq PO DAILY 02/12/20 


Cefuroxime Axetil [Ceftin -] 500 mg PO Q12H #10 tablet 02/17/20 


metroNIDAZOLE [Flagyl -] 500 mg PO TIDCM #15 tablet 02/17/20

## 2024-11-01 NOTE — CONS
DATE OF CONSULTATION:  

 

DATE OF DICTATION:  2020 

 

INFECTIOUS DISEASE CONSULTATION 

 

HISTORY OF PRESENT ILLNESS:  This is a 77-year-old woman who presents to the ER with

a 10-day history of abdominal pain.  She denies fever or chills.  She has had no

recent contacts.  She has had intermittent pain, accompanied by loose stools that are

non-bloody.  She has not taken any antibiotics at home.  She had an episode of

vomiting yesterday and one this morning.  In the ER, she had a CAT scan done that

showed extensive left-sided colonic diverticulosis with mild stranding, consistent

with diverticulitis.  She was given 1 dose of Cipro and Flagyl, and then she was

started on piperacillin and tazobactam.  No cultures were obtained. 

 

PAST MEDICAL HISTORY:  Notable for vertigo, hypertension, hyperlipidemia, prior

diverticulitis (the last episode was over a years ago), history of anxiety,

osteoarthritis and hypothyroidism. 

 

PAST SURGICAL HISTORY:  Notable for cholecystectomy, appendectomy, hernia repair, as

well as hysterectomy.  She has also had cataract removal of her left eye. 

 

SOCIAL HISTORY:  No history of cigarette, alcohol or substance use.  She lives with

her son.  The history is obtained via the help of her niece, who is at the bedside. 

She is a retired dressmaker.  

 

ALLERGIES:  She has no known drug allergies.

 

MEDICATIONS:  Her medications include omeprazole, amlodipine, Crestor, Antivert,

Lopressor, Synthroid, Claritin and potassium.  

 

FAMILY HISTORY:  Notable for her mother who is , had multiple CVAs.  Father

 of CVA as well.  Both of them lived until 90.  She has a sister who has

recurrent diverticulitis.  

 

REVIEW OF SYSTEMS:  She had a colonoscopy in September of last year.  Review of

systems is otherwise as per HPI.  Her pain is improved. 

 

PHYSICAL EXAMINATION:  

Vital Signs:  Her temperature is 98.3, pulse 69, blood pressure 146/70, respiratory

rate 20. 

HEENT:  She is normocephalic.  Her eyes are anicteric. 

Neck:  Supple.  

Lungs:  Clear to auscultation. 

Heart:  Regular rate and rhythm.

Abdomen:  Soft.  She has bowel sounds.  She has mild left lower quadrant discomfort

on palpation. 

Extremities:  Without edema. 

 

DIAGNOSTIC STUDIES:  White count on admission was 11; repeat was 9.3.  BUN 9,

creatinine 0.8.  CRP is 2 and sedimentation rate is 38.  

 

SUMMARY:  This is a 77-year-old woman with extensive diverticulosis, with CAT scan

findings of diverticulitis in the setting of abdominal pain, mainly left-sided.  She

has diarrhea as well.  We have sent stools for Clostridium difficile, culture and

white cells, and will treat her with Rocephin and Flagyl at this time.  

 

 

JULES MARCANO M.D.

 

[id number]

DD: 2020 18:20

DT: 2020 18:50

Job #:  [id number] [No] : No [0] : 1) Little interest or pleasure doing things: Not at all (0) [2] : 2) Feeling down, depressed, or hopeless for more than half of the days (2) [PHQ-2 Negative - No further assessment needed] : PHQ-2 Negative - No further assessment needed [Former] : Former [10-14] : 10-14 [> 15 Years] : > 15 Years